# Patient Record
Sex: MALE | Race: OTHER | URBAN - METROPOLITAN AREA
[De-identification: names, ages, dates, MRNs, and addresses within clinical notes are randomized per-mention and may not be internally consistent; named-entity substitution may affect disease eponyms.]

---

## 2021-04-14 NOTE — PATIENT DISCUSSION
04[facility]14[facility]20211[id number]/20&nbsp;SN &nbsp; &nbsp; cgw

## 2021-04-14 NOTE — PATIENT DISCUSSION
POSTERIOR VITREOUS DETACHMENT, OU: PATIENT EDUCATION GIVEN. NO HOLES. NO TEARS. RETURN FOR FOLLOW-UP AS SCHEDULED FOR FURTHER OCT TESTING AND EVALUATION.

## 2022-03-02 NOTE — PATIENT DISCUSSION
04/14/2021OS-3.50+[id number]&nbsp;SN &nbsp; &nbsp; cgw Quality 431: Preventive Care And Screening: Unhealthy Alcohol Use - Screening: Patient not identified as an unhealthy alcohol user when screened for unhealthy alcohol use using a systematic screening method Detail Level: Detailed Quality 226: Preventive Care And Screening: Tobacco Use: Screening And Cessation Intervention: Patient screened for tobacco use and is an ex/non-smoker Quality 130: Documentation Of Current Medications In The Medical Record: Current Medications Documented

## 2022-03-29 ENCOUNTER — NEW PATIENT (OUTPATIENT)
Dept: URBAN - METROPOLITAN AREA CLINIC 1 | Facility: CLINIC | Age: 72
End: 2022-03-29

## 2022-03-29 DIAGNOSIS — H25.813: ICD-10-CM

## 2022-03-29 PROCEDURE — 92015 DETERMINE REFRACTIVE STATE: CPT

## 2022-03-29 PROCEDURE — 99204 OFFICE O/P NEW MOD 45 MIN: CPT

## 2022-03-29 ASSESSMENT — VISUAL ACUITY
OD_SC: 20/25
OD_BAT: 20/400
OS_BAT: 20/50
OS_SC: 20/20-1

## 2022-03-29 ASSESSMENT — TONOMETRY
OD_IOP_MMHG: 21
OS_IOP_MMHG: 21

## 2022-03-29 NOTE — PATIENT DISCUSSION
Visually Significant secondary to glare, discussed the risks, benefits, alternatives, and limitations of cataract surgery. The patient stated a full understanding and a desire to proceed with the procedure. The patient will need to return for pre-op appointment with cataract measurements and to have any additional questions answered, and start pre-operative eye drops as directed.

## 2022-05-06 ENCOUNTER — PRE-OP/H&P (OUTPATIENT)
Dept: URBAN - METROPOLITAN AREA CLINIC 1 | Facility: CLINIC | Age: 72
End: 2022-05-06

## 2022-05-06 VITALS
SYSTOLIC BLOOD PRESSURE: 132 MMHG | BODY MASS INDEX: 35.07 KG/M2 | WEIGHT: 245 LBS | HEART RATE: 57 BPM | HEIGHT: 70 IN | DIASTOLIC BLOOD PRESSURE: 106 MMHG

## 2022-05-06 DIAGNOSIS — H25.813: ICD-10-CM

## 2022-05-06 PROCEDURE — 92136 OPHTHALMIC BIOMETRY: CPT

## 2022-05-06 PROCEDURE — 92025 CPTRIZED CORNEAL TOPOGRAPHY: CPT

## 2022-05-06 PROCEDURE — 92012 INTRM OPH EXAM EST PATIENT: CPT

## 2022-05-06 ASSESSMENT — VISUAL ACUITY
OS_BAT: 20/50
OS_SC: J2
OD_SC: J2
OD_BAT: 20/400
OD_SC: 20/25
OS_SC: 20/20

## 2022-05-06 ASSESSMENT — TONOMETRY
OD_IOP_MMHG: 20
OS_IOP_MMHG: 20

## 2022-05-06 NOTE — PATIENT DISCUSSION
(Ascan/HP) Cataract (OD) -- Visually Significant (Secondary to glare), discussed the risks, benefits, alternatives, and limitations of cataract surgery. The patient stated a full understanding and a desire to proceed with the procedure. Discussed with patient if post-op drops are more than $120 for all three combined when filling at their Pharmacy, please call our office to request generic substitutions. Phaco PCL OD.

## 2022-05-18 ENCOUNTER — SURGERY/PROCEDURE (OUTPATIENT)
Dept: URBAN - METROPOLITAN AREA SURGERY 1 | Facility: SURGERY | Age: 72
End: 2022-05-18

## 2022-05-18 PROCEDURE — 66984 XCAPSL CTRC RMVL W/O ECP: CPT

## 2022-05-19 ENCOUNTER — POST-OP (OUTPATIENT)
Dept: URBAN - METROPOLITAN AREA CLINIC 1 | Facility: CLINIC | Age: 72
End: 2022-05-19

## 2022-05-19 DIAGNOSIS — Z96.1: ICD-10-CM

## 2022-05-19 PROCEDURE — 99024 POSTOP FOLLOW-UP VISIT: CPT

## 2022-05-19 ASSESSMENT — TONOMETRY: OD_IOP_MMHG: 30

## 2022-05-19 ASSESSMENT — VISUAL ACUITY: OD_SC: 20/20

## 2022-05-26 ENCOUNTER — POST OP/EVAL OF SECOND EYE (OUTPATIENT)
Dept: URBAN - METROPOLITAN AREA CLINIC 1 | Facility: CLINIC | Age: 72
End: 2022-05-26

## 2022-05-26 VITALS
SYSTOLIC BLOOD PRESSURE: 133 MMHG | HEIGHT: 70 IN | WEIGHT: 245 LBS | HEART RATE: 57 BPM | BODY MASS INDEX: 35.07 KG/M2 | DIASTOLIC BLOOD PRESSURE: 75 MMHG

## 2022-05-26 DIAGNOSIS — Z96.1: ICD-10-CM

## 2022-05-26 DIAGNOSIS — H25.812: ICD-10-CM

## 2022-05-26 PROCEDURE — 92136 OPHTHALMIC BIOMETRY: CPT

## 2022-05-26 PROCEDURE — 99024 POSTOP FOLLOW-UP VISIT: CPT

## 2022-05-26 ASSESSMENT — VISUAL ACUITY
OS_BAT: 20/50
OS_SC: 20/25
OD_SC: 20/20

## 2022-05-26 ASSESSMENT — TONOMETRY
OD_IOP_MMHG: 16
OS_IOP_MMHG: 18

## 2022-05-26 NOTE — PATIENT DISCUSSION
Cataract OS - Visually Significant Secondary to glare, discussed the risks, benefits, alternatives, and limitations of cataract surgery. The patient stated a full understanding and a desire to proceed with the procedure. Discussed with patient if post-op drops are more than $120 for all three combined when filling at their Pharmacy, please call our office to request generic substitutions. Phaco PCL OS (Standard).

## 2022-06-01 ENCOUNTER — SURGERY/PROCEDURE (OUTPATIENT)
Dept: URBAN - METROPOLITAN AREA SURGERY 1 | Facility: SURGERY | Age: 72
End: 2022-06-01

## 2022-06-01 DIAGNOSIS — H25.812: ICD-10-CM

## 2022-06-01 PROCEDURE — 66984 XCAPSL CTRC RMVL W/O ECP: CPT

## 2022-06-02 ENCOUNTER — POST-OP (OUTPATIENT)
Dept: URBAN - METROPOLITAN AREA CLINIC 1 | Facility: CLINIC | Age: 72
End: 2022-06-02

## 2022-06-02 DIAGNOSIS — Z96.1: ICD-10-CM

## 2022-06-02 PROCEDURE — 99024 POSTOP FOLLOW-UP VISIT: CPT

## 2022-06-02 ASSESSMENT — TONOMETRY
OD_IOP_MMHG: 16
OS_IOP_MMHG: 22

## 2022-06-02 ASSESSMENT — VISUAL ACUITY
OS_SC: 20/20
OD_SC: 20/20

## 2022-06-23 ENCOUNTER — POST-OP (OUTPATIENT)
Dept: URBAN - METROPOLITAN AREA CLINIC 1 | Facility: CLINIC | Age: 72
End: 2022-06-23

## 2022-06-23 DIAGNOSIS — Z96.1: ICD-10-CM

## 2022-06-23 PROCEDURE — 99024 POSTOP FOLLOW-UP VISIT: CPT

## 2022-06-23 ASSESSMENT — TONOMETRY
OS_IOP_MMHG: 15
OD_IOP_MMHG: 13

## 2022-06-23 ASSESSMENT — VISUAL ACUITY
OD_SC: 20/20
OS_SC: 20/20